# Patient Record
Sex: FEMALE | Race: WHITE | NOT HISPANIC OR LATINO | URBAN - NONMETROPOLITAN AREA
[De-identification: names, ages, dates, MRNs, and addresses within clinical notes are randomized per-mention and may not be internally consistent; named-entity substitution may affect disease eponyms.]

---

## 2018-09-06 PROBLEM — H52.221: Noted: 2018-09-06

## 2018-09-06 PROBLEM — H52.4: Noted: 2018-09-06

## 2018-09-06 PROBLEM — H52.03: Noted: 2018-09-06

## 2020-08-26 ENCOUNTER — IMPORTED ENCOUNTER (OUTPATIENT)
Dept: URBAN - NONMETROPOLITAN AREA CLINIC 1 | Facility: CLINIC | Age: 60
End: 2020-08-26

## 2020-08-26 PROCEDURE — 92015 DETERMINE REFRACTIVE STATE: CPT

## 2020-08-26 PROCEDURE — 92014 COMPRE OPH EXAM EST PT 1/>: CPT

## 2020-08-26 NOTE — PATIENT DISCUSSION
Presbyopia OU-Discussed diagnosis with patient. Hyperopia OU-Discussed diagnosis with patient. Astigmatism OD-Discussed diagnosis with patient. ***Updated spec Rx given. Recommend lens that will provide comfort as well as protect safety and health of eyes.

## 2022-04-09 ASSESSMENT — VISUAL ACUITY
OD_SC: 20/25-2
OS_SC: 20/20

## 2022-04-09 ASSESSMENT — TONOMETRY
OD_IOP_MMHG: 14
OS_IOP_MMHG: 14

## 2023-08-29 ENCOUNTER — NEW PATIENT (OUTPATIENT)
Dept: RURAL CLINIC 3 | Facility: CLINIC | Age: 63
End: 2023-08-29

## 2023-08-29 DIAGNOSIS — H52.221: ICD-10-CM

## 2023-08-29 DIAGNOSIS — H52.4: ICD-10-CM

## 2023-08-29 DIAGNOSIS — H52.03: ICD-10-CM

## 2023-08-29 PROCEDURE — 92015 DETERMINE REFRACTIVE STATE: CPT

## 2023-08-29 PROCEDURE — 92004 COMPRE OPH EXAM NEW PT 1/>: CPT

## 2023-08-29 ASSESSMENT — TONOMETRY
OS_IOP_MMHG: 12
OD_IOP_MMHG: 12

## 2023-08-29 ASSESSMENT — VISUAL ACUITY
OS_CC: 20/25-1
OD_CC: 20/20-2

## 2024-09-11 ENCOUNTER — COMPREHENSIVE EXAM (OUTPATIENT)
Dept: RURAL CLINIC 3 | Facility: CLINIC | Age: 64
End: 2024-09-11

## 2024-09-11 DIAGNOSIS — H52.03: ICD-10-CM

## 2024-09-11 DIAGNOSIS — H52.221: ICD-10-CM

## 2024-09-11 DIAGNOSIS — H52.4: ICD-10-CM

## 2024-09-11 PROCEDURE — 92015 DETERMINE REFRACTIVE STATE: CPT

## 2024-09-11 PROCEDURE — 92014 COMPRE OPH EXAM EST PT 1/>: CPT
